# Patient Record
Sex: FEMALE | Race: WHITE | Employment: UNEMPLOYED | URBAN - METROPOLITAN AREA
[De-identification: names, ages, dates, MRNs, and addresses within clinical notes are randomized per-mention and may not be internally consistent; named-entity substitution may affect disease eponyms.]

---

## 2018-02-01 ENCOUNTER — HOSPITAL ENCOUNTER (OUTPATIENT)
Dept: MAMMOGRAPHY | Age: 63
Discharge: HOME | End: 2018-02-01
Attending: OBSTETRICS & GYNECOLOGY
Payer: MEDICARE

## 2018-02-01 DIAGNOSIS — Z12.31 VISIT FOR SCREENING MAMMOGRAM: ICD-10-CM

## 2018-02-01 PROCEDURE — 77067 SCR MAMMO BI INCL CAD: CPT

## 2018-02-13 PROBLEM — M47.12 OSTEOARTHRITIS OF CERVICAL SPINE WITH MYELOPATHY: Status: ACTIVE | Noted: 2018-02-13

## 2020-09-15 PROBLEM — M51.9 LUMBAR DISC DISEASE: Status: ACTIVE | Noted: 2020-09-15

## 2022-03-19 PROBLEM — M51.9 LUMBAR DISC DISEASE: Status: ACTIVE | Noted: 2020-09-15

## 2022-03-19 PROBLEM — M47.12 OSTEOARTHRITIS OF CERVICAL SPINE WITH MYELOPATHY: Status: ACTIVE | Noted: 2018-02-13

## 2022-08-29 ENCOUNTER — TELEPHONE (OUTPATIENT)
Dept: UROLOGY | Age: 67
End: 2022-08-29

## 2022-08-29 NOTE — TELEPHONE ENCOUNTER
Patient called and LVM stating she needs a refill of Acidophilus. She is requesting a new prescription with \"full\" refills for the year.       Last seen 8/2021

## 2022-09-20 RX ORDER — L. ACIDOPHILUS/LACTOBAC SPOR 35MM-25MM
TABLET ORAL
Qty: 100 TABLET | Refills: 0 | OUTPATIENT
Start: 2022-09-20